# Patient Record
Sex: MALE | Race: WHITE | NOT HISPANIC OR LATINO | Employment: PART TIME | URBAN - METROPOLITAN AREA
[De-identification: names, ages, dates, MRNs, and addresses within clinical notes are randomized per-mention and may not be internally consistent; named-entity substitution may affect disease eponyms.]

---
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
## 2022-04-11 ENCOUNTER — HISTORICAL (OUTPATIENT)
Dept: ADMINISTRATIVE | Facility: HOSPITAL | Age: 22
End: 2022-04-11

## 2022-04-25 VITALS
BODY MASS INDEX: 18.26 KG/M2 | SYSTOLIC BLOOD PRESSURE: 105 MMHG | OXYGEN SATURATION: 97 % | WEIGHT: 149.94 LBS | HEIGHT: 76 IN | DIASTOLIC BLOOD PRESSURE: 67 MMHG

## 2023-12-02 ENCOUNTER — OFFICE VISIT (OUTPATIENT)
Dept: URGENT CARE | Facility: CLINIC | Age: 23
End: 2023-12-02
Payer: COMMERCIAL

## 2023-12-02 VITALS
HEIGHT: 75 IN | HEART RATE: 66 BPM | DIASTOLIC BLOOD PRESSURE: 72 MMHG | RESPIRATION RATE: 16 BRPM | OXYGEN SATURATION: 97 % | SYSTOLIC BLOOD PRESSURE: 113 MMHG | WEIGHT: 160.25 LBS | TEMPERATURE: 98 F | BODY MASS INDEX: 19.93 KG/M2

## 2023-12-02 DIAGNOSIS — N48.1 BALANITIS: Primary | ICD-10-CM

## 2023-12-02 PROCEDURE — 99203 OFFICE O/P NEW LOW 30 MIN: CPT | Mod: S$PBB,,,

## 2023-12-02 PROCEDURE — 99214 OFFICE O/P EST MOD 30 MIN: CPT | Mod: PBBFAC

## 2023-12-02 PROCEDURE — 99203 PR OFFICE/OUTPT VISIT, NEW, LEVL III, 30-44 MIN: ICD-10-PCS | Mod: S$PBB,,,

## 2023-12-02 RX ORDER — KETOCONAZOLE 20 MG/G
CREAM TOPICAL DAILY
Qty: 30 G | Refills: 0 | Status: SHIPPED | OUTPATIENT
Start: 2023-12-02 | End: 2023-12-16

## 2023-12-02 NOTE — PROGRESS NOTES
"Subjective:      Patient ID: Juan José Parsk is a 23 y.o. male.    Vitals:  height is 6' 3" (1.905 m) and weight is 72.7 kg (160 lb 4.4 oz). His temperature is 98.2 °F (36.8 °C). His blood pressure is 113/72 and his pulse is 66. His respiration is 16 and oxygen saturation is 97%.     Chief Complaint: Rash (States has white scaly skin to penis > 1month)    Cc as above. States the rash comes and goes.    Rash  This is a new problem. The current episode started more than 1 month ago. The problem is unchanged. The affected locations include the genitalia. The rash is characterized by itchiness, redness and scaling. Pertinent negatives include no fever. Past treatments include nothing.       Constitution: Negative for fever.   Skin:  Positive for rash and erythema.      Objective:     Physical Exam   Constitutional: He is oriented to person, place, and time. normal  HENT:   Head: Normocephalic and atraumatic.   Nose: Nose normal.   Eyes: Conjunctivae are normal.   Neck: Neck supple.   Cardiovascular: Normal rate, regular rhythm, normal heart sounds and normal pulses.   Pulmonary/Chest: Effort normal and breath sounds normal.   Abdominal: Normal appearance.   Genitourinary: Circumcised. Penile erythema present.               Comments: Rash to penis     Musculoskeletal: Normal range of motion.         General: Normal range of motion.   Neurological: no focal deficit. He is alert and oriented to person, place, and time.   Skin: Skin is warm, dry and rash. erythema   Psychiatric: His behavior is normal. Mood normal.   Nursing note and vitals reviewed.chaperone present (ROLDAN Callahan)         Assessment:     1. Balanitis        Plan:       Balanitis  -     ketoconazole (NIZORAL) 2 % cream; Apply topically once daily. for 14 days  Dispense: 30 g; Refill: 0        Keep the area cool and dry. Apply medication as ordered. Wear boxers or cotton underwear. Use handout as guidance.Return to the clinic if symptoms does not improve or " worsen.

## 2023-12-02 NOTE — PATIENT INSTRUCTIONS
Keep the area cool and dry. Apply medication as ordered. Wear boxers or cotton underwear. Use handout as guidance. Return to the clinic if symptoms does not improve or worsen.